# Patient Record
Sex: FEMALE | URBAN - METROPOLITAN AREA
[De-identification: names, ages, dates, MRNs, and addresses within clinical notes are randomized per-mention and may not be internally consistent; named-entity substitution may affect disease eponyms.]

---

## 2020-07-21 ENCOUNTER — IMPORTED ENCOUNTER (OUTPATIENT)
Dept: URBAN - METROPOLITAN AREA CLINIC 31 | Facility: CLINIC | Age: 80
End: 2020-07-21

## 2020-07-21 PROBLEM — H17.89: Noted: 2020-07-21

## 2020-07-21 PROBLEM — Z96.1: Noted: 2020-07-21

## 2020-07-21 PROCEDURE — 92015 DETERMINE REFRACTIVE STATE: CPT

## 2020-07-21 PROCEDURE — 92004 COMPRE OPH EXAM NEW PT 1/>: CPT

## 2020-07-21 PROCEDURE — 92025 CPTRIZED CORNEAL TOPOGRAPHY: CPT

## 2020-07-21 NOTE — PATIENT DISCUSSION
1.  Pseudophakia OU - IOLs stable. Monitor for changes in vision. 2.  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 Explained reduction in BCVA due to irregularity. Monitor3. Refractive error - Change glasses. 4.  Return for an appointment in 1 year for comprehensive exam. with Dr. Sascha Russo

## 2021-07-21 ENCOUNTER — IMPORTED ENCOUNTER (OUTPATIENT)
Dept: URBAN - METROPOLITAN AREA CLINIC 31 | Facility: CLINIC | Age: 81
End: 2021-07-21

## 2021-07-21 PROCEDURE — 92014 COMPRE OPH EXAM EST PT 1/>: CPT

## 2021-07-21 NOTE — PATIENT DISCUSSION
1.  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 2. Pseudophakia OU - IOLs stable. Monitor for changes in vision. 3.  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 Explained reduction in BCVA due to irregularity. Monitor4. Refractive error - Glasses change optional. 5.  Return for an appointment in 1 year for comprehensive exam. with Dr. Ezequiel Good.

## 2021-07-21 NOTE — PATIENT DISCUSSION
1.  Pseudophakia OU - IOLs stable. Monitor for changes in vision. 2.  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 Explained reduction in BCVA due to irregularity. Monitor3. Refractive error - Glasses change optional. 4.  Return for an appointment in 1 year for comprehensive exam. with Dr. Renetta Coffey.

## 2021-08-05 PROBLEM — Z96.1: Noted: 2021-08-05

## 2021-08-05 PROBLEM — H17.89: Noted: 2021-08-05

## 2022-02-16 NOTE — PATIENT DISCUSSION
Instructed to call immediately if any new distortion, blurring, decreased vision or eye pain. FAMILY HISTORY:  No pertinent family history in first degree relatives

## 2022-04-02 ASSESSMENT — VISUAL ACUITY
OD_CC: 20/80
OS_PH: CC 20/40 -2
OS_SC: 20/60+2
OS_CC: 20/100
OD_SC: 20/50+1
OD_PH: CC 20/40

## 2025-04-28 ENCOUNTER — NEW PATIENT (OUTPATIENT)
Age: 85
End: 2025-04-28

## 2025-04-28 VITALS — WEIGHT: 135 LBS | HEIGHT: 62 IN | BODY MASS INDEX: 24.84 KG/M2

## 2025-04-28 DIAGNOSIS — H52.4: ICD-10-CM

## 2025-04-28 DIAGNOSIS — H52.13: ICD-10-CM

## 2025-04-28 DIAGNOSIS — H52.223: ICD-10-CM

## 2025-04-28 PROCEDURE — 92004 COMPRE OPH EXAM NEW PT 1/>: CPT

## 2025-04-28 PROCEDURE — 92015 DETERMINE REFRACTIVE STATE: CPT
